# Patient Record
(demographics unavailable — no encounter records)

---

## 2024-10-18 NOTE — HEALTH RISK ASSESSMENT
[Good] : ~his/her~  mood as  good [No] : No [1 or 2 (0 pts)] : 1 or 2 (0 points) [Never (0 pts)] : Never (0 points) [0] : 2) Feeling down, depressed, or hopeless: Not at all (0) [PHQ-2 Negative - No further assessment needed] : PHQ-2 Negative - No further assessment needed [Never] : Never [With Family] : lives with family [] :  [Sexually Active] : sexually active [Feels Safe at Home] : Feels safe at home [Fully functional (bathing, dressing, toileting, transferring, walking, feeding)] : Fully functional (bathing, dressing, toileting, transferring, walking, feeding) [Fully functional (using the telephone, shopping, preparing meals, housekeeping, doing laundry, using] : Fully functional and needs no help or supervision to perform IADLs (using the telephone, shopping, preparing meals, housekeeping, doing laundry, using transportation, managing medications and managing finances) [Reports changes in hearing] : Reports no changes in hearing [Reports changes in vision] : Reports no changes in vision [Reports changes in dental health] : Reports changes in dental health [Smoke Detector] : smoke detector [de-identified] : , 2 children

## 2024-10-18 NOTE — ASSESSMENT
[FreeTextEntry1] : . CPE Labs  Pregnant 6w, 3rd child, excited for the pregnancy, following with OB/GYN   Hx of multiple allergic reactions to unclear allergen. Usually skin rash/flushing at variable locations. Also concerning occasional oral reaction possible anaphylaxis. Allergy and immunology referral for testing.   Thyroid nodule 1cm in 2020, recheck thyroid US on patient request.   Derm referral for annual skin exam  Cervical Cancer Screening- Pap following with OB/GYN

## 2024-10-18 NOTE — HISTORY OF PRESENT ILLNESS
[FreeTextEntry1] : CPE, allergies, thyroid nodule  [de-identified] : 35F h/o vit D deficiency, thyroid nodules, 6 weeks pregnant, here for CPE. Taking prenatal vitamin, B-complex, and vitamin D.  C/o many years of occasional warm itchy bumpy, occasionally flat rash on her skin as well as flushing reaction across multiple parts of her body, as well as and rare episodes of lip/tongue swelling in the past. She recalls an episode of facial swelling due to an allergic reaction during college where she went to the ED and was given benadryl with improvement. No clear identifiable trigger for these multiple episodes, but symptoms usually occur at night time. No change with seasonal changes. When she has these reactions she doesn't take anything and it goes away. She's seen a rheumatologist in the past for elevated RAÚL but full panel was negative.

## 2024-10-31 NOTE — PHYSICAL EXAM
[Chaperone Declined] : Patient declined chaperone [Awake] : awake [Alert] : alert [Soft] : soft [Oriented x3] : oriented to person, place, and time [Uterine Adnexae] : were not tender and not enlarged [Acute Distress] : no acute distress [Mass] : no breast mass [Nipple Discharge] : no nipple discharge [Axillary LAD] : no axillary lymphadenopathy [Tender] : non tender

## 2024-10-31 NOTE — OB HISTORY
[Pregnancy History] : girl [___] : no pregnancy complications reported [FreeTextEntry1] : IOL for FGR

## 2024-10-31 NOTE — COUNSELING
[Breast Self Exam] : breast self exam [Nutrition] : nutrition [Exercise] : exercise [Vitamins/Supplements] : vitamins/supplements [Pain Management] : pain management [Influenza Vaccine] : influenza vaccine

## 2024-10-31 NOTE — HISTORY OF PRESENT ILLNESS
[Breasts] : breasts [Spontaneous/Secondary] : is secondary/spontaneous [Pregnancy] : pregnant [Sexually Active] : is sexually active [Monogamous] : is monogamous [Female ___] : [unfilled] female

## 2024-10-31 NOTE — CHIEF COMPLAINT
[Urgent Visit] : Urgent Visit [FreeTextEntry1] : "I took an at home pregnancy test and it was positive."

## 2024-10-31 NOTE — PLAN
[TextEntry] : - reoriented patient to midwifery practice  - dating sono done today --> 7w6d based on LMP  - PE done today --> does not desire PAP/HPV test today  - urine culture & oelg/chlam done today  - continue to take PNV  - desires all fetal testing offered --> NT/ultrascreen/NIPTs to be done at next appointment  - warning signs reviewed and when to call midwives  - recommended to continue to stay active and exercise  - RTO in 4wks for initial prenatal appointment

## 2025-04-30 NOTE — CONSULT LETTER
[Dear  ___] : Dear ~LUÍS, [Consult Letter:] : I had the pleasure of evaluating your patient, [unfilled]. [Please see my note below.] : Please see my note below. [Consult Closing:] : Thank you very much for allowing me to participate in the care of this patient.  If you have any questions, please do not hesitate to contact me. [Sincerely,] : Sincerely, [FreeTextEntry3] : Maxx Holley MD Medical Oncology/Hematology [facility] [place], [facility]   [facility] [employer] at [facility]

## 2025-04-30 NOTE — BEGINNING OF VISIT
[0] : 2) Feeling down, depressed, or hopeless: Not at all (0) [UIY3Fcylr] : 0 [Never] : Never [Date Discussed (MM/DD/YY): ___] : Discussed: [unfilled] [Patient/Caregiver not ready to engage] : Patient/Caregiver not ready to engage

## 2025-04-30 NOTE — CONSULT LETTER
[Dear  ___] : Dear ~LUÍS, [Consult Letter:] : I had the pleasure of evaluating your patient, [unfilled]. [Please see my note below.] : Please see my note below. [Consult Closing:] : Thank you very much for allowing me to participate in the care of this patient.  If you have any questions, please do not hesitate to contact me. [Sincerely,] : Sincerely, [FreeTextEntry3] : Maxx Holley MD Medical Oncology/Hematology [facility] [place], [facility]   [facility] [employer] [facility] at [facility]

## 2025-04-30 NOTE — ASSESSMENT
[FreeTextEntry1] : 34 y/o F who presents for management of iron deficiency  during pregnancy. Currently 33 weeks pregnant. Expected due date 6/13/25.  4/25/25 Labs:  WBC 7.16, HGB 12.1, HCT 34.5, , Fe 153, TIBC 537, Sat 28%, Ferritin 17, B12 741, FA 19.3  Patient with HGB on 4/25/25 at 12.1, currently not anemic. Noted to be iron deficient with ferritin at 17.  Plan: -Patient has [employer] Optimal Iron supplement, advised to use 2 pills, 3 times a week.  -Also discussed IV Venofer if she is intolerant to oral iron -RTO in 3 months post partum

## 2025-04-30 NOTE — ADDENDUM
[FreeTextEntry1] : Documented by Tiny Harding acting as scribe for Dr. Holley on  04/30/2025.    All Medical record entries made by the Scribe were at my, Dr. Holley's, direction and personally dictated by me on  04/30/2025. I have reviewed the chart and agree that the record accurately reflects my personal performance of the history, physical exam, assessment and plan. I have also personally directed, reviewed, and agreed with the discharge instructions.

## 2025-04-30 NOTE — HISTORY OF PRESENT ILLNESS
[de-identified] : ROBBIE BERTRAND is a 35 year old F with a PMHx of thyroid nodule who presents for iron deficiency  during pregnancy.  Currently 33 weeks pregnant. Expected due date 6/13/25.   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

## 2025-04-30 NOTE — HISTORY OF PRESENT ILLNESS
[de-identified] : ROBBIE BERTRAND is a 35 year old F with a PMHx of thyroid nodule who presents for iron deficiency  during pregnancy.  Currently 33 weeks pregnant. Expected due date 6/13/25.   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

## 2025-04-30 NOTE — BEGINNING OF VISIT
[0] : 2) Feeling down, depressed, or hopeless: Not at all (0) [JZH3Alxik] : 0 [Never] : Never [Date Discussed (MM/DD/YY): ___] : Discussed: [unfilled] [Patient/Caregiver not ready to engage] : Patient/Caregiver not ready to engage

## 2025-07-28 NOTE — HISTORY OF PRESENT ILLNESS
[Postpartum Follow Up] : postpartum follow up [Delivery Date: ___] : on [unfilled] [] : delivered by vaginal delivery [Male] : Delivery History: baby boy [Wt. ___] : weighing [unfilled] [Breastfeeding] : currently nursing [Intended Contraception] : Intended Contraception: [Condoms] : condoms [Back to Normal] : is back to normal in size [Normal] : the vagina was normal [Examination Of The Breasts] : breasts are normal [Doing Well] : is doing well [No Sign of Infection] : is showing no signs of infection [None] : None [Complications:___] : no complications [Rhogam] : Rhogam was not administered [Rubella Vaccine] : Rubella vaccine was not administered [Pertussis Vaccine] : Pertussis vaccine was not administered [BTL] : no tubal ligation [BF with Difficulty] : nursing without difficulty [Resumed Menses] : has not resumed her menses [Resumed Kathleen] : has not resumed intercourse [Abdominal Pain] : no abdominal pain [Back Pain] : no back pain [Breast Pain] : no breast pain [BreastFeeding Problems] : no breastfeeding problems [Chest Pain] : no chest pain [Cracked Nipples] : no cracked nipples [S/Sx PP Depression] : signs/symptoms of postpartum depression [Chapin Depression Scale ___ (0-30)] : [unfilled] [(1) Not quite so much now] : (1) Not quite so much now [(0) As much as I always could] : (0) No, not at all [(2) Yes, some of the time] : (2) Yes, some of the time [(1) Not very often] : (1) Not very often [(0) No, not at all] : (0) No, not at all [Heavy Bleeding] : no heavy bleeding [Irregular Bleeding] : no irregular bleeding [Leg Pain] : no leg pain [Shortness of Breath] : no shortness of breath [Suicidal Ideation] : no suicidal ideation [Vaginal Discharge] : no vaginal discharge [Chills] : no chills [Fatigue] : no fatigue [Dysuria] : no dysuria [Fever] : no fever [Headache] : no headache [Nausea] : no nausea [Vomiting] : no vomiting [Hematoma] : no vaginal hematoma [Abscess Formation] : no vaginal abscess [Cervix Sample Taken] : cervical sample not taken for a Pap smear [FreeTextEntry8] : Pt is a 36y.o  presenting for her 6-week postpartum visit following a vaginal delivery of a male infant at [id number] gestation. She reports feeling well but fatigued and states that she is breastfeeding with no difficulties. She describes her sleep as interrupted but adequate and reports mild moodiness. Denies symptoms of postpartum depression symptoms. Reports she is no longer bleeding. Has some vaginal dryness but has been using vaginal moisturizers which have helped significantly. Pt has no concerns regarding her healing. She is not interested in contraception and is considering partner vasectomy at this time. She denies significant bowel or urinary issues. Patient requesting iron studies as she had deficiency in pregnancy and has been supplementing. [de-identified] : Normal Postpartum Exam [de-identified] : - Contraceptive options reviewed, safe sex practices encouraged, pt advised may resume intercourse. - Easing back into exercise discussed - S/S of mastitis reviewed - When to call reviewed - Resources for pelvic floor PT offered. Pt declined. - RTO 3-4 months for annual visit or sooner PRN